# Patient Record
(demographics unavailable — no encounter records)

---

## 2024-12-12 NOTE — DISCUSSION/SUMMARY
[FreeTextEntry1] : 18-year-old male with a right ring finger ligamentous mallet injury sustained 2 weeks ago, discussed splinting and splinting was provided today.  Follow-up in 2 weeks to ensure that there is no further extension lag  Mallet Finger: The anatomy and physiology of a mallet finger was discussed with the patient today.  Typically, the extensor tendon is torn off of the dorsal aspect of the distal phalanx.  This results in flexion of the distal interphalangeal joint, with incomplete extension.  Typically, and less the joint is subluxed, this is treated through conservative measures.  An extension block splint is worn over the distal interphalangeal joint for 8 weeks continuously, followed by 4 weeks of nocturnal use.  After healing, there is typically a small flexion deformity at the distal interphalangeal joint.  Surgery does not typically change these results.   [General Appearance - Alert] : alert [General Appearance - Well Nourished] : well nourished [General Appearance - In No Acute Distress] : in no acute distress [General Appearance - Well Developed] : well developed [General Appearance - Well-Appearing] : well appearing [Appearance Of Head] : the head was normocephalic [Facies] : there were no dysmorphic facial features [Sclera] : the conjunctiva were normal [Outer Ear] : the ears and nose were normal in appearance [Examination Of The Oral Cavity] : mucous membranes were moist and pink [Auscultation Breath Sounds / Voice Sounds] : breath sounds clear to auscultation bilaterally [Normal Chest Appearance] : the chest was normal in appearance [Apical Impulse] : quiet precordium with normal apical impulse [Heart Rate And Rhythm] : normal heart rate and rhythm [Heart Sounds] : normal S1 and S2 [No Murmur] : no murmurs  [Heart Sounds Gallop] : no gallops [Heart Sounds Pericardial Friction Rub] : no pericardial rub [Heart Sounds Click] : no clicks [Arterial Pulses] : normal upper and lower extremity pulses with no pulse delay [Edema] : no edema [Capillary Refill Test] : normal capillary refill [Bowel Sounds] : normal bowel sounds [Abdomen Soft] : soft [Nondistended] : nondistended [Abdomen Tenderness] : non-tender [Nail Clubbing] : no clubbing  or cyanosis of the fingers [Motor Tone] : normal muscle strength and tone [Cervical Lymph Nodes Enlarged Anterior] : The anterior cervical nodes were normal [Cervical Lymph Nodes Enlarged Posterior] : The posterior cervical nodes were normal [] : no rash [Skin Lesions] : no lesions [Skin Turgor] : normal turgor [Demonstrated Behavior - Infant Nonreactive To Parents] : interactive [Mood] : mood and affect were appropriate for age [Demonstrated Behavior] : normal behavior

## 2024-12-12 NOTE — PHYSICAL EXAM
[de-identified] : Right ring finger 45 degree extension lag to the DIP joint, can passively correct Full motion at the MCP and PIP, no hyperextension at the PIP joint No swelling [de-identified] : X-ray which the patient brought in on his phone of the right ring finger, 1 view lateral view does not demonstrate any bony mallet injury.

## 2024-12-12 NOTE — HISTORY OF PRESENT ILLNESS
[FreeTextEntry1] : Right ring finger ligamentous mallet injury  18M presenting today for a right ring finger injury sustained 2 weeks prior while playing basketball.  He notices that the finger has a extension lag and is here today for further evaluation.  He had previous x-rays done in urgent care which were unremarkable which he brought in today with him.  He has been self splinting with a metal splint and full extension to the entire digit.  Reports no pain.  Works as a .

## 2024-12-30 NOTE — HISTORY OF PRESENT ILLNESS
[FreeTextEntry1] : Right ring finger ligamentous mallet injury  18M presenting today for a right ring finger ligamentous mallet injury now s/p 2 weeks out from splitting. Reports he has been compliant with splint

## 2024-12-30 NOTE — DISCUSSION/SUMMARY
[FreeTextEntry1] : Right ring finger ligamentous mallet injury now s/p 2 weeks of extension splinting with no extension lag on exam. Cont with splinting for an additional 6 weeks. F/u 6 weeks. All questions answered.